# Patient Record
(demographics unavailable — no encounter records)

---

## 2025-06-06 NOTE — ASSESSMENT
[FreeTextEntry1] : The patient is a 69-year-old male who presents with bilateral lower extremity swelling and venous stasis skin changes.  The patient states he is asymptomatic with regards to his superficial veins.  Venous duplex performed at [facility] showed no evidence of DVT No evidence of cellulitis Venous stasis skin changes Recommend daily Ace wrap compression or compression stocking therapy at 20 to 30 mmHg compression knee-high's. No vascular surgery intervention  30 minutes spent with the patient obtaining history, reviewing labs/studies ordering studies, physical exam and discussing plan.    I, Dr. Gill, personally performed the evaluation and management (E/M) services for this established patient who presents today with (a) new problem(s)/exacerbation of (an) existing condition(s). That E/M includes conducting the clinically appropriate interval history &/or exam, assessing all new/exacerbated conditions, and establishing a new plan of care. Today, my CIERRA, Motista was here to observe my evaluation and management service for this new problem/exacerbated condition and follow the plan of care established by me going forward.  Thank you for allowing me to participate in the care of your patient.   Sincerely,   Vinicio Gill MD, RPVI, FACS Associate Professor of Surgery , Vascular Fellowship Director of Limb Salvage Surgery [facility] [employer] [facility] at [facility]

## 2025-06-06 NOTE — PHYSICAL EXAM
[2+] : left 2+ [Varicose Veins Of Lower Extremities] : bilaterally [] : bilaterally [Ankle Swelling On The Right] : mild [Ankle Swelling (On Exam)] : not present

## 2025-06-06 NOTE — DATA REVIEWED
[FreeTextEntry1] : 1 / 2 Abdiel Dunn  Report date:4/28/2025 View Order (Report matches exam selected in Patient History pane)     ACC: [id number] EXAM: DUPLEX SCAN EXT VEINS LOWER BI ORDERED BY: ZEHRA LANE  PROCEDURE DATE: 04/26/2025    INTERPRETATION: CLINICAL INFORMATION: 69-year-old male with leg swelling  TECHNIQUE: Duplex sonography of the BILATERAL LOWER extremity veins with color and spectral Doppler, with and without compression.  FINDINGS:  RIGHT: Normal compressibility of the RIGHT common femoral, femoral and popliteal veins. Doppler examination shows normal spontaneous and phasic flow. No RIGHT calf vein thrombosis is detected.  LEFT: Normal compressibility of the LEFT common femoral, femoral and popliteal veins. Doppler examination shows normal spontaneous and phasic flow. No LEFT calf vein thrombosis is detected.  IMPRESSION: No evidence of deep venous thrombosis in either lower extremity.      --- End of Report ---     HERMELINDA JANSEN MD; Vascular Fellow This document has been electronically signed. ABDIEL DUNN MD; Attending Vascular Surgery This document has been electronically signed. Apr 28 2025 7:29AM

## 2025-06-06 NOTE — HISTORY OF PRESENT ILLNESS
[FreeTextEntry1] : The patient is a 69-year-old male who presents for evaluation for cellulitis of his bilateral lower extremities.  The patient offers no new complaints.  He has a history of atrial fibrillation currently on anticoagulation.  The patient has bilateral lower extremity edema.